# Patient Record
Sex: MALE | Race: BLACK OR AFRICAN AMERICAN | Employment: STUDENT | URBAN - METROPOLITAN AREA
[De-identification: names, ages, dates, MRNs, and addresses within clinical notes are randomized per-mention and may not be internally consistent; named-entity substitution may affect disease eponyms.]

---

## 2020-11-30 ENCOUNTER — OFFICE VISIT (OUTPATIENT)
Dept: FAMILY MEDICINE CLINIC | Facility: CLINIC | Age: 25
End: 2020-11-30
Payer: COMMERCIAL

## 2020-11-30 VITALS
RESPIRATION RATE: 16 BRPM | SYSTOLIC BLOOD PRESSURE: 120 MMHG | WEIGHT: 204.5 LBS | TEMPERATURE: 99 F | HEART RATE: 70 BPM | DIASTOLIC BLOOD PRESSURE: 72 MMHG | HEIGHT: 74 IN | BODY MASS INDEX: 26.24 KG/M2

## 2020-11-30 DIAGNOSIS — K59.04 CHRONIC IDIOPATHIC CONSTIPATION: Primary | ICD-10-CM

## 2020-11-30 DIAGNOSIS — Z28.21 INFLUENZA VACCINATION DECLINED BY PATIENT: ICD-10-CM

## 2020-11-30 DIAGNOSIS — Z80.0 FAMILY HISTORY OF COLORECTAL CANCER: ICD-10-CM

## 2020-11-30 PROCEDURE — 3074F SYST BP LT 130 MM HG: CPT | Performed by: FAMILY MEDICINE

## 2020-11-30 PROCEDURE — 3008F BODY MASS INDEX DOCD: CPT | Performed by: FAMILY MEDICINE

## 2020-11-30 PROCEDURE — 3078F DIAST BP <80 MM HG: CPT | Performed by: FAMILY MEDICINE

## 2020-11-30 PROCEDURE — 99214 OFFICE O/P EST MOD 30 MIN: CPT | Performed by: FAMILY MEDICINE

## 2020-11-30 RX ORDER — ALBUTEROL SULFATE 90 UG/1
2 AEROSOL, METERED RESPIRATORY (INHALATION) EVERY 6 HOURS PRN
COMMUNITY

## 2020-11-30 NOTE — PATIENT INSTRUCTIONS
Stool Softener (colace or miralax) daily until stools are soft, regular and without straining. Then can decrease use to as needed. Laxatives or suppository if constipation symptoms are severe.    Recommend high fiber diet, good hydration, maintaining a h · Bowel obstruction or perforation  Home care  All treatment should be done after talking with your healthcare provider. This is especially true if you have another medical problems, are taking prescription medicines, or are an older adult.  Treatment most · Fever of 100.4°F (38°C) or higher, or as directed by your healthcare provider  · Failure to resume normal bowel movements  · Pain in your abdomen or back gets worse  · Nausea or vomiting  · Swelling in your abdomen  · Blood in the stool  · Black, tarry s · Fruits. Try to eat 2 cups a day. Apples, oranges, strawberries, pears, and bananas are good sources. (Note: Fruit juice is low in fiber.)  · Vegetables. Try to eat at least 2.5 cups a day. Add asparagus, carrots, broccoli, peas, and corn to your meals.

## 2020-11-30 NOTE — PROGRESS NOTES
HPI:   Daniel White is a 22year old male that presents for intermittent abdominal pain.       Patient presents with:  Stomach Pain: on and off for a while - gets chronic constipation, extreme bloating, states its starting to affect the way he plays, he i the following:    Height as of this encounter: 6' 2\" (1.88 m). Weight as of this encounter: 204 lb 8 oz (92.8 kg). Vital signs reviewed. Appears stated age, well groomed.   Physical Exam:  GEN:  Patient is alert, awake and oriented, well developed, wel

## 2020-12-06 PROBLEM — K59.04 CHRONIC IDIOPATHIC CONSTIPATION: Status: ACTIVE | Noted: 2020-12-06

## 2020-12-06 PROBLEM — Z80.0 FAMILY HISTORY OF COLORECTAL CANCER: Status: ACTIVE | Noted: 2020-12-06

## 2021-01-04 ENCOUNTER — LAB ENCOUNTER (OUTPATIENT)
Dept: LAB | Age: 26
End: 2021-01-04
Attending: INTERNAL MEDICINE
Payer: COMMERCIAL

## 2021-01-04 DIAGNOSIS — K62.5 RECTAL BLEEDING: ICD-10-CM

## 2021-01-05 LAB — SARS-COV-2 RNA RESP QL NAA+PROBE: NOT DETECTED

## 2021-01-07 PROBLEM — K62.5 ANAL BLEEDING: Status: ACTIVE | Noted: 2021-01-07

## 2021-01-07 PROBLEM — K59.09 OTHER CONSTIPATION: Status: ACTIVE | Noted: 2021-01-07

## 2021-08-25 PROBLEM — K62.5 ANAL BLEEDING: Status: RESOLVED | Noted: 2021-01-07 | Resolved: 2021-08-25

## 2021-08-25 PROBLEM — K59.04 CHRONIC IDIOPATHIC CONSTIPATION: Status: RESOLVED | Noted: 2020-12-06 | Resolved: 2021-08-25

## 2021-08-25 PROBLEM — K59.09 OTHER CONSTIPATION: Status: RESOLVED | Noted: 2021-01-07 | Resolved: 2021-08-25

## (undated) NOTE — LETTER
[employer] ACTION PLAN for Jennifer Burgos     : 1995     Date: 2020  Provider:  Moni Milan DO  Phone for doctor or clinic: [address] [facility], [address] [place] [address] , [address] [facility]  [phone]